# Patient Record
Sex: MALE | Race: ASIAN | NOT HISPANIC OR LATINO | Employment: FULL TIME
[De-identification: names, ages, dates, MRNs, and addresses within clinical notes are randomized per-mention and may not be internally consistent; named-entity substitution may affect disease eponyms.]

---

## 2017-05-04 ENCOUNTER — HOSPITAL ENCOUNTER (OUTPATIENT)
Dept: LAB | Age: 22
Setting detail: SPECIMEN
Discharge: HOME | End: 2017-05-04

## 2017-05-04 ENCOUNTER — OFFICE VISIT - HEALTHEAST (OUTPATIENT)
Dept: FAMILY MEDICINE | Facility: CLINIC | Age: 22
End: 2017-05-04

## 2017-05-04 DIAGNOSIS — Z02.1 ENCOUNTER FOR PRE-EMPLOYMENT HEALTH SCREENING EXAMINATION: ICD-10-CM

## 2017-05-04 ASSESSMENT — MIFFLIN-ST. JEOR: SCORE: 1412.18

## 2021-05-31 VITALS — WEIGHT: 112.5 LBS | BODY MASS INDEX: 18.74 KG/M2 | HEIGHT: 65 IN

## 2021-06-10 NOTE — PROGRESS NOTES
ASSESSMENT and plan  1. Encounter for pre-employment health screening examination medications for TB treatment he has never had latent TB.  No recent loss of weight, night sweats, fever,.  Still smokes.   QTF-Mycobacterium tuberculosis by QuantiFERON-TB Gold       Will do QuantiFERON test today.  He has never been tested before.  He has never taken    CHIEF COMPLAINT:  Chief Complaint   Patient presents with     New patient     TB test for work       HISTORY OF PRESENT ILLNESS:  Miguel Angel is a 21 y.o. male presenting to the clinic today to establish care. Miguel Angel is present with a Christiane .     TB Test: He is inquiring about a TB test for work. He is not sure if he has received a BCG vaccine. He has not been treated for TB previously.  He denies night sweats, persistent cough, sudden weight loss.     REVIEW OF SYSTEMS:   He denies abdominal pain and skin rash.   All other 10 point review of systems are negative.    PFSH:  He is a current smoker. Reviewed as below.  History   Smoking Status     Current Some Day Smoker   Smokeless Tobacco     Current User       No family history on file.    Social History     Social History     Marital status: Single     Spouse name: N/A     Number of children: N/A     Years of education: N/A     Occupational History     Not on file.     Social History Main Topics     Smoking status: Current Some Day Smoker     Smokeless tobacco: Current User     Alcohol use No     Drug use: No     Sexual activity: Not on file     Other Topics Concern     Not on file     Social History Narrative     No narrative on file       No past surgical history on file.    No Known Allergies    Active Ambulatory Problems     Diagnosis Date Noted     No Active Ambulatory Problems     Resolved Ambulatory Problems     Diagnosis Date Noted     No Resolved Ambulatory Problems     No Additional Past Medical History       VITALS:  Vitals:    05/04/17 0947   BP: 90/54   Patient Site: Left Arm   Patient Position: Sitting  "  Cuff Size: Adult Regular   Pulse: 76   Resp: 16   Temp: 97.4  F (36.3  C)   TempSrc: Oral   Weight: 112 lb 8 oz (51 kg)   Height: 5' 5\" (1.651 m)     Wt Readings from Last 3 Encounters:   05/04/17 112 lb 8 oz (51 kg)     Body mass index is 18.72 kg/(m^2).    PHYSICAL EXAM:  General: Alert, cooperative, no distress, appears stated age  Lungs: Clear to auscultation bilaterally, respirations unlabored  Chest wall: No tenderness or deformity  Heart: Regular rate and rhythm, S1 and S2 normal, no murmur, rub, or gallop  Neurologic:  A & O x 3.  No tremor, no focal findings.      ADDITIONAL HISTORY SUMMARIZED (2): None.  DECISION TO OBTAIN EXTRA INFORMATION (1): None.   RADIOLOGY TESTS (1): None.  LABS (1): Labs ordered.   MEDICINE TESTS (1): None.  INDEPENDENT REVIEW (2 each): None.     The visit lasted a total of 7 minutes face to face with the patient. Over 50% of the time was spent counseling and educating the patient about TB test.     IOtilia, am scribing for and in the presence of, Dr. So.    IDr. So, personally performed the services described in this documentation, as scribed by Otilia Chance in my presence, and it is both accurate and complete.    MEDICATIONS:  No current outpatient prescriptions on file.     No current facility-administered medications for this visit.        Total data points:1        "

## 2024-01-27 ENCOUNTER — HOSPITAL ENCOUNTER (EMERGENCY)
Facility: HOSPITAL | Age: 29
Discharge: HOME | End: 2024-01-27
Admitting: PHYSICIAN ASSISTANT

## 2024-01-27 VITALS
TEMPERATURE: 98.2 F | DIASTOLIC BLOOD PRESSURE: 68 MMHG | SYSTOLIC BLOOD PRESSURE: 117 MMHG | HEART RATE: 61 BPM | BODY MASS INDEX: 21.66 KG/M2 | OXYGEN SATURATION: 100 % | HEIGHT: 65 IN | WEIGHT: 130 LBS | RESPIRATION RATE: 16 BRPM

## 2024-01-27 DIAGNOSIS — K04.7 DENTAL INFECTION: ICD-10-CM

## 2024-01-27 DIAGNOSIS — K04.7 DENTAL ABSCESS: Primary | ICD-10-CM

## 2024-01-27 DIAGNOSIS — K08.89 PAIN, DENTAL: ICD-10-CM

## 2024-01-27 PROCEDURE — 41800 DRAINAGE OF GUM LESION: CPT

## 2024-01-27 PROCEDURE — 99283 EMERGENCY DEPT VISIT LOW MDM: CPT | Mod: 25

## 2024-01-27 PROCEDURE — 64400 NJX AA&/STRD TRIGEMINAL NRV: CPT

## 2024-01-27 ASSESSMENT — ACTIVITIES OF DAILY LIVING (ADL): ADLS_ACUITY_SCORE: 35

## 2024-01-27 NOTE — Clinical Note
Miguel Angel Burgess was seen and treated in our emergency department on 1/27/2024.  He may return to work on 01/29/2024.       If you have any questions or concerns, please don't hesitate to call.      Jennifer Parker PA-C

## 2024-01-27 NOTE — ED TRIAGE NOTES
Right, lower molar with pain for 3 days.  Pt reports tooth is loose.  Pt has obvious swelling to jaw.  No fever.

## 2024-01-27 NOTE — DISCHARGE INSTRUCTIONS
You have a dental infection.  The pus pocket (abscess) was drained in the emergency department.  Continue to swish/spit and rinse your mouth with warm water or mouthwash  Make sure you are brushing your teeth regularly.    Start the antibiotics as prescribed:  Augmentin 2 times daily for 7 days.    You need to follow up with a dentist as soon as possible.  See below list to call and schedule and appointment.     Return to the emergency department if you develop fevers, worsening/changing pain, difficulty swallowing/breathing, difficulty opening your mouth, or any other concerning symptoms. We would be happy to see you.       [facility]   The [facility]  [address], [place]  [phone] Accepts [place]    [facility]  [address], [facility]  [phone] No Fee Hours and services vary each month - call them before going.  Sometimes only offer extractions.   [facility]  [address],[facility]  [phone] Sliding fee: ER visit - $50 up front  regular - $35 up front Call or arrive at 7:45 AM on Mondays, Tues, or Thursdays to sign up for same-day appointments for dentalpain / emergencies.        [facility] Sliding  fee  Call for details Walk-ins and same-day appointments  Walk-in's accepted 8-11AM and 1-4PM  Monday-Friday   [address]     [phone]          [facility] ([facility] dental Sliding fee If no insurance, call first.    [address], [facility]     [phone]          [facility]  [address], [place]  [phone] Sliding fee Call 8:00 AM Mon-Thurs for next-day  appointments (for emergencies/pain only) Help with [place] is available.        [facility]  [address], [place]  [phone] Call for fees Only for adult dental emergencies.  Costs about 30% less than private practice clinics  To sign up for the regular dental clinic, call [phone].        [facility] ([facility])  [address]  [phone] Sliding fee scale For people without dentalinsurance only.   Cleaning, xray, exams, fillings, sealants only - no extractions or rootcanals, etc.  No walk-ins.        [facility]  [address] [facility]  [phone] No Fee No walk-ins, not accepting people with insurance. Extractions for uninsured people only. Call Friday 2PM to get on next week's list        [facility] [[email or web address]]   [address]  [phone] Sliding fee  $40 up front No walk-ins. Call at 8AM Mon-Fri forsame- day visits.  Can schedule Saturday emergency visits by calling Friday morning.   [facility]  [address]  [phone] Sliding fee  Call for details No walk-ins.  For emergency appointments:  Call on Thursday 3PM (for Friday appt) OR Call on Friday 3PM (for Monday appt)        [facility]  [address] [facility]  [phone] Sliding fee -   Call for details Call on Tuesdays at 3PM to get anurgent Weds. appointment.  Call anytime during business hours to schedule other appointments.             OTHER RESOURCES       [facility],   [address]  [address] [place]  [address]  [phone]    [facility]  [address] [place] [address]  [phone]      Referral Service, 1-800-DENTIST        Open 9a to 9p 7 days a week            Open 7 days a week 7a to 5p.                [employer], 1-705.282.8260 For people who are elderly,disabled, or medically compromised and have no other way to pay for dental care. Call to get an application. If approved, services are provided through volunteer dentists.

## 2025-07-05 NOTE — ED PROVIDER NOTES
EMERGENCY DEPARTMENT ENCOUNTER      NAME: Miguel Angel Burgess  AGE: 28 year old male  YOB: 1995  MRN: [id number]  EVALUATION DATE & TIME: 2024  8:12 AM    PCP: No primary care provider on file.    ED PROVIDER: Jennifer Parker PA-C      Chief Complaint   Patient presents with    Dental Pain         FINAL IMPRESSION:  1. Dental abscess    2. Pain, dental    3. Dental infection          ED COURSE & MEDICAL DECISION MAKIN:13 AM I introduced myself to patient, performed initial HPI and examination.       28 year old male with no pertinent PMH presents to the Emergency Department for evaluation of dental pain, swelling. Reports symptoms x 3 days.     VSS, afebrile. Exam with swelling along the right lower jaw. Visible abscess at periapical space of tooth #30. Sublingual space is soft, no trismus. Nothing to suggest Stiven's Angina or deep space infection. No difficulty swallowing or breathing. Clinically presentation most consistent with dental infection, dental abscess. Dental block performed in the emergency department. I&D performed with return of purulent drainage. With no evidence of deeper infection, no indication for additional labs or imaging at this time. Discussed plan for treatment at home with supportive managements, antibiotics. Instructed on importance of follow up with dentist and provided with a list of low/no cost dentists. Instructed on red flags/indications to return to the emergency department. All questions were answered to the best of my ability and patient is agreeable with plan.       Medical Decision Making    History:  Supplemental history from: Documented in chart  External Record(s) reviewed: Documented in chart    Work Up:  Chart documentation includes differential considered and any EKGs or imaging independently interpreted by provider.  In additional to work up documented, I considered the following work up: Documented in chart, if applicable.    External  "consultation:  Discussion of management with another provider: Documented in chart, if applicable    Complicating factors:  Care impacted by chronic illness: N/A  Care affected by social determinants of health: Access to Medical Care    Disposition considerations: Discharge. I prescribed additional prescription strength medication(s) as charted. See documentation for any additional details.      MEDICATIONS GIVEN IN THE EMERGENCY:  Medications - No data to display    NEW PRESCRIPTIONS STARTED AT TODAY'S ER VISIT  New Prescriptions    AMOXICILLIN-CLAVULANATE (AUGMENTIN) 875-125 MG TABLET    Take 1 tablet by mouth 2 times daily for 7 days          =================================================================    HPI    Patient information was obtained from: Patient    Use of : N/A         Miguel Angel Burgess is a 28 year old male with no pertinent PMH who presents to this ED by private car for evaluation of dental pain. Patient reports 3 days of pain, swelling to the right lower molar. Reports tooth has been loose.    No difficulty opening his mouth. No difficulty swallowing or breathing. No bad taste or discharge from the mouth. No fevers or chills.    Does have a history of prior dental infections. Does not currently see a dentist.      REVIEW OF SYSTEMS   ROS negative unless otherwise stated in HPI    PAST MEDICAL HISTORY:  No past medical history on file.    PAST SURGICAL HISTORY:  No past surgical history on file.    CURRENT MEDICATIONS:    amoxicillin-clavulanate (AUGMENTIN) 875-125 MG tablet        ALLERGIES:  No Known Allergies    FAMILY HISTORY:  No family history on file.    SOCIAL HISTORY:   Social History     Socioeconomic History    Marital status: Single   Tobacco Use    Smoking status: Some Days    Smokeless tobacco: Current   Substance and Sexual Activity    Alcohol use: No    Drug use: No       VITALS:  /68   Pulse 61   Temp 98.2  F (36.8  C) (Tympanic)   Resp 16   Ht 1.651 m (5' 5\")   " Wt 59 kg (130 lb)   SpO2 100%   BMI 21.63 kg/m      PHYSICAL EXAM    Constitutional: Well developed, Well nourished, NAD, GCS 15   HENT: Normocephalic, Atraumatic. Swelling along right lower jaw line. No trismus, sublingual space is soft. Normal Phonation. Poor dentition. Moderate area of fluctuance at the periapical space below tooth #30    Neck- Supple, Nontender. Normal ROM. No stridor.  Eyes: Conjunctiva normal   Respiratory: No respiratory distress, speaking in full sentences.  Cardiovascular: Normal heart rate, Regular rhythm, No murmurs.   Musculoskeletal: No deformities, Moves all extremities equally  Integument: Warm, Dry, No erythema, ecchymosis, or rash.  Neurologic: Alert & oriented x 3, Normal sensory function. No focal deficit  Psychiatric: Affect normal, Judgment normal, Mood normal. Cooperative.     LAB:  All pertinent labs reviewed and interpreted.       RADIOLOGY:  Reviewed all pertinent imaging. Please see official radiology report.  No orders to display       EKG:    None    PROCEDURES:   PROCEDURE: Dental Nerve Block   INDICATIONS: Dental pain   PROCEDURE PROVIDER: Jennifer Parker PA-C   SITE: Right Supraperiosteal to achieve anesthesia of Tooth #30     MEDICATION: 1 mL of 1% Lidocaine with epinephrine   NOTE: The usual periapical landmarks were identified and the needle was positioned at the midpoint of the mucobuccal fold.  Area was aspirated and there was no return of blood.  I then injected the medication into the site.    COMPLICATIONS: Patient tolerated procedure well, without complication     PROCEDURE: Incision and Drainage   INDICATIONS: Localized abscess, periapical   PROCEDURE PROVIDER: Jennifer Parker PA-C   SITE: Periapical space, tooth #30   MEDICATION: Dental block as noted above   NOTE: Local anesthetic was injected via dental block as noted above, anesthesia effects demonstrated prior to proceeding.  The area of maximal fluctuance was opened with a 18 gauge needle to allow for  drainage. Area was gently massaged with return of purulent drainage.  The abscess was drained. No Packing was placed into the abscess cavity.  A sterile dressing was placed over the area.   COMPLEXITY: Simple    Simple = single, furuncle, paronychia, superficial  Complex = multiple or abscess requiring probing, loculations, packing placement   COMPLICATIONS: Patient tolerated procedure well, without complication             Jennifer Parker PA-C  Emergency Medicine  [facility] EMERGENCY DEPARTMENT  [address] [place] [id number]  [phone]             Jennifer Parker PA-C  01/27/24 0839     0